# Patient Record
Sex: MALE | Race: BLACK OR AFRICAN AMERICAN | Employment: FULL TIME | URBAN - METROPOLITAN AREA
[De-identification: names, ages, dates, MRNs, and addresses within clinical notes are randomized per-mention and may not be internally consistent; named-entity substitution may affect disease eponyms.]

---

## 2024-09-02 ENCOUNTER — HOSPITAL ENCOUNTER (EMERGENCY)
Age: 29
Discharge: HOME | End: 2024-09-02
Payer: MEDICAID

## 2024-09-02 VITALS
HEART RATE: 59 BPM | TEMPERATURE: 98.2 F | DIASTOLIC BLOOD PRESSURE: 89 MMHG | SYSTOLIC BLOOD PRESSURE: 126 MMHG | OXYGEN SATURATION: 99 % | RESPIRATION RATE: 18 BRPM

## 2024-09-02 DIAGNOSIS — R21 RASH AND OTHER NONSPECIFIC SKIN ERUPTION: Primary | ICD-10-CM

## 2024-09-02 PROCEDURE — 99283 EMERGENCY DEPT VISIT LOW MDM: CPT

## 2024-09-02 RX ORDER — TRIAMCINOLONE ACETONIDE 1 MG/G
OINTMENT TOPICAL 2 TIMES DAILY
Qty: 80 G | Refills: 0 | Status: SHIPPED | OUTPATIENT
Start: 2024-09-02 | End: 2024-09-09

## 2024-09-02 ASSESSMENT — PAIN - FUNCTIONAL ASSESSMENT
PAIN_FUNCTIONAL_ASSESSMENT: NONE - DENIES PAIN
PAIN_FUNCTIONAL_ASSESSMENT: NONE - DENIES PAIN

## 2024-09-02 NOTE — ED PROVIDER NOTES
[facility] EMERGENCY DEPARTMENT  EMERGENCY DEPARTMENT ENCOUNTER        Pt Name: Luz Christie  MRN: [id number]  Birthdate 1995  Date of evaluation: 9/2/2024  Provider: Hamilton Avilez PA-C  PCP: No primary care provider on file.  Note Started: 12:09 PM EDT 9/2/24      MARITZA. I have evaluated this patient.        CHIEF COMPLAINT       No chief complaint on file.      HISTORY OF PRESENT ILLNESS: 1 or more Elements     Luz Christie is a 29 y.o. male, Uz[place] Creole speaking only, who presents complaining of a pruritic, nonpainful rash on the back of his neck.  Denies any recent antibiotics, denies any pain.    Nursing Notes were all reviewed and agreed with or any disagreements were addressed in the HPI.    Historians other than the patient: patient    Limitations to history : Language Italian creole only    Social Determinants Significantly Affecting Health : Patient has significant healthcare illiteracy    Records Reviewed (External and Source): none  REVIEW OF SYSTEMS :      Review of Systems    Positives and Pertinent negatives as per HPI.     SURGICAL HISTORY   No past surgical history on file.    CURRENTMEDICATIONS       Discharge Medication List as of 9/2/2024 10:09 AM          ALLERGIES     Patient has no known allergies.    FAMILYHISTORY     No family history on file.     SOCIAL HISTORY          SCREENINGS          [place] Coma Scale  Eye Opening: Spontaneous  Best Verbal Response: Oriented  Best Motor Response: Obeys commands  [place] Coma Scale Score: 15              CIWA Assessment  BP: 126/89  Pulse: 59             PHYSICAL EXAM  1 or more Elements     Physical Exam    CONSTITUTIONAL: Awake and alert, oriented, appears non-toxic  HENT: Atraumatic, normocephalic, airway patent  EYES: Conjunctiva clear, pupils equal, round,  NECK: no masses, trachea midline  PULMONARY/CHEST: Clear to auscultation bilaterally, Non-tender.  ABDOMINAL: Non-distended, soft,

## 2024-09-02 NOTE — DISCHARGE INSTRUCTIONS
Family Physicians taking new patients.     1. Call to schedule follow up hospital follow up appointment:   [facility] at [facility] [phone]   [address] [facility] Building Room 30     [facility]  Dr Sanchez and Maricel Cowart NP  Call for appt. [phone]  [address] [facility] [facility], [address]  ( All insurances accepted)    [facility] [phone]   [address], [address]     [facility] [phone]   [address] [facility], Czech speaking Staff     2. Call for new patient Primary Care Physician appointment:   Physician [facility]inder [phone]     Dr. Vera and Dr. Hines [phone]   [address] [place], [place]     Sonya Zamarripa [phone]   [address] [place], [place]     Dr. Sanchez and Maricel Cowart [phone]   [address] [facility], [address] [place], [place]     Dr. Bhatti and Jannette Barron [phone]   [address] [place], [place]     Sofía Jung [phone]   [address] [place], [place]     Keya Lang CNP - [facility] [phone] [address] [place], [place] *Private Pay ONLY - Membership Program*     Bobby Reyes PA and Nick Duong [phone]   [address] [facility], [address] [facility] (*Active Waiting List*) [phone]   [address] [place], [place]     Dr. Rutledge [phone]   [address] [place], [place]     Douglas Devlin [phone]   [address] [place], [place]     Dr. Arora [phone]   [address] [facility], [address] [place], [place]     Dr. Vera and Dr. Grady [phone]   [address] [facility], [address] [place], [place]     Xiao Peterson PA and Verna Kruse [phone]   [address] [place] [address], [address] [place], [place]     Kesha DAVIDSON, Venu DAVIDSON and Patria